# Patient Record
Sex: MALE | Race: WHITE | URBAN - METROPOLITAN AREA
[De-identification: names, ages, dates, MRNs, and addresses within clinical notes are randomized per-mention and may not be internally consistent; named-entity substitution may affect disease eponyms.]

---

## 2020-03-12 ENCOUNTER — OFFICE VISIT (OUTPATIENT)
Dept: SURGERY | Age: 35
End: 2020-03-12

## 2020-03-12 VITALS — RESPIRATION RATE: 18 BRPM | HEIGHT: 73 IN | TEMPERATURE: 98.9 F | HEART RATE: 90 BPM | OXYGEN SATURATION: 97 %

## 2020-03-12 DIAGNOSIS — L02.91 ABSCESS: Primary | ICD-10-CM

## 2020-03-12 RX ORDER — IBUPROFEN 600 MG/1
TABLET ORAL
COMMUNITY
Start: 2020-03-10

## 2020-03-12 RX ORDER — SULFAMETHOXAZOLE AND TRIMETHOPRIM 800; 160 MG/1; MG/1
TABLET ORAL
COMMUNITY
Start: 2020-03-10

## 2020-03-12 RX ORDER — OXYCODONE HYDROCHLORIDE 5 MG/1
5 TABLET ORAL
Qty: 5 TAB | Refills: 0 | Status: SHIPPED | OUTPATIENT
Start: 2020-03-12 | End: 2020-03-15

## 2020-03-12 RX ORDER — AMOXICILLIN AND CLAVULANATE POTASSIUM 875; 125 MG/1; MG/1
TABLET, FILM COATED ORAL
COMMUNITY
Start: 2020-03-10

## 2020-03-12 NOTE — PROGRESS NOTES
1. Have you been to the ER, urgent care clinic since your last visit? Hospitalized since your last visit? No    2. Have you seen or consulted any other health care providers outside of the [address] [place] [address] since your last visit? Include any pap smears or colon screening.  No

## 2020-03-12 NOTE — PATIENT INSTRUCTIONS
Daily wound care: 
Remove packing daily starting 3/13/2020 You may shower and get wound wet Pat dry Repack with 1/2 inch iodoform gauze Cover with 4X4 and tape Skin Abscess: Care Instructions Your Care Instructions A skin abscess is a bacterial infection that forms a pocket of pus. A boil is a kind of skin abscess. The doctor may have cut an opening in the abscess so that the pus can drain out. You may have gauze in the cut so that the abscess will stay open and keep draining. You may need antibiotics. You will need to follow up with your doctor to make sure the infection has gone away. The doctor has checked you carefully, but problems can develop later. If you notice any problems or new symptoms, get medical treatment right away. Follow-up care is a key part of your treatment and safety. Be sure to make and go to all appointments, and call your doctor if you are having problems. It's also a good idea to know your test results and keep a list of the medicines you take. How can you care for yourself at home? · Apply warm and dry compresses, a heating pad set on low, or a hot water bottle 3 or 4 times a day for pain. Keep a cloth between the heat source and your skin. · If your doctor prescribed antibiotics, take them as directed. Do not stop taking them just because you feel better. You need to take the full course of antibiotics. · Take pain medicines exactly as directed. ? If the doctor gave you a prescription medicine for pain, take it as prescribed. ? If you are not taking a prescription pain medicine, ask your doctor if you can take an over-the-counter medicine. · Keep your bandage clean and dry. Change the bandage whenever it gets wet or dirty, or at least one time a day. · If the abscess was packed with gauze: 
? Keep follow-up appointments to have the gauze changed or removed.  If the doctor instructed you to remove the gauze, follow the instructions you were given for how to remove it. ? After the gauze is removed, soak the area in warm water for 15 to 20 minutes 2 times a day, until the wound closes. When should you call for help? Call your doctor now or seek immediate medical care if: 
  · You have signs of worsening infection, such as: 
? Increased pain, swelling, warmth, or redness. ? Red streaks leading from the infected skin. ? Pus draining from the wound. ? A fever.  
 Watch closely for changes in your health, and be sure to contact your doctor if: 
  · You do not get better as expected. Where can you learn more? Go to http://porsche-amadeo.info/ Enter E635 in the search box to learn more about \"Skin Abscess: Care Instructions. \" Current as of: October 30, 2019Content Version: 12.4 © 1882-0298 [employer]. Care instructions adapted under license by [employer] (which disclaims liability or warranty for this information). If you have questions about a medical condition or this instruction, always ask your healthcare professional. [address] any warranty or liability for your use of this information.

## 2020-03-12 NOTE — PROGRESS NOTES
Jaylene Todd. is a 30 y.o. male who is referred by [facility], [address] [place] [address], for further evaluation of a right breast abscess. MrJeremias Mckeon tells me that he developed a \"pimple\" on the right side of his chest approximately one week ago. He drained the \"pimple\" in the shower. Despite this, he developed progressive right chest pain, swelling and redness. Associated chills and nausea. Seen at [facility] where he was found to have an abscess. Started on antibiotics with some improvement in his symptoms. He has otherwise been in his usual state of health. Past Medical History:   Diagnosis Date    Abscess 3/12/2020     History reviewed. No pertinent surgical history. History reviewed. No pertinent family history. Social History     Socioeconomic History    Marital status: SINGLE     Spouse name: Not on file    Number of children: Not on file    Years of education: Not on file    Highest education level: Not on file     Review of systems negative except as noted. Review of Systems   Constitutional: Positive for chills. Gastrointestinal: Positive for blood in stool and nausea. Musculoskeletal: Positive for joint pain. Right breast pain. Neurological: Positive for weakness and headaches. Psychiatric/Behavioral: Positive for depression. The patient is nervous/anxious and has insomnia. Physical Exam  Vitals signs reviewed. Constitutional:       General: He is not in acute distress. Appearance: Normal appearance. HENT:      Head: Normocephalic and atraumatic. Eyes:      General: No scleral icterus. Neck:      Musculoskeletal: Neck supple. Cardiovascular:      Rate and Rhythm: Normal rate and regular rhythm. Pulmonary:      Effort: Pulmonary effort is normal.      Breath sounds: Normal breath sounds. Chest:          Comments: There is an open wound with purulent appearing drainage. There is an associated fluctuant subcutaneous mass.  Overlying skin is indurated and cellulitic. Clinically, this is c/w an abscess. Abdominal:      General: There is no distension. Palpations: Abdomen is soft. Tenderness: There is no abdominal tenderness. There is no guarding or rebound. Musculoskeletal: Normal range of motion. Lymphadenopathy:      Cervical: No cervical adenopathy. Neurological:      General: No focal deficit present. Mental Status: He is alert. ASSESSMENT and PLAN  Blima Smiling. is a 30 y.o. male with a right breastabscess. He should benefit from improved drainage. Discussed procedure with him including risks of bleeding, further infection, need for further surgery. He understands and wishes to proceed. Consent on chart. [facility]  OFFICE PROCEDURE PROGRESS NOTE        Chart reviewed for the following:   Liliane Guzman MD, have reviewed the History, Physical and updated the Allergic reactions for [address] [place] [address] Drive performed immediately prior to start of procedure:   Liliane Guzman MD, have performed the following reviews on Blima Smiling. prior to the start of the procedure:            * Patient was identified by name and date of birth   * Agreement on procedure being performed was verified  * Risks and Benefits explained to the patient  * Procedure site verified and marked as necessary  * Patient was positioned for comfort  * Consent was signed and verified     Time: 5:15 PM      Date of procedure: 3/12/2020    Procedure performed by:  Josias Bryson MD    Provider assisted by: Fredy Hinson LPN    How tolerated by patient: tolerated the procedure well with no complications    Post Procedural Pain Scale: 2 - Hurts Little Bit    Comments: None. Procedure: Following betadine prep and drape, local anesthetic was infiltrated and the right chest wound was extended sharply. Purulent material evacuated. Loculations broken up.  The skin edges were sharply debrided back to healthy appearing tissue. Hemostasis with pressure. Wound packed with iodoform gauze. Dry dressing applied. Told Mr. Rodriguez Rumble that he can remove packing tomorrow and get open wound wet. Asked him to keep a dry dressing over the wound. Abx as prescribed. Rx for Oxycodone. Will see on March 13, 2020 for a wound check.       CC: [facility], Yolette Comes

## 2020-03-13 ENCOUNTER — OFFICE VISIT (OUTPATIENT)
Dept: SURGERY | Age: 35
End: 2020-03-13

## 2020-03-13 VITALS
HEART RATE: 86 BPM | HEIGHT: 73 IN | WEIGHT: 255 LBS | TEMPERATURE: 97.9 F | BODY MASS INDEX: 33.8 KG/M2 | RESPIRATION RATE: 18 BRPM | OXYGEN SATURATION: 96 % | DIASTOLIC BLOOD PRESSURE: 78 MMHG | SYSTOLIC BLOOD PRESSURE: 126 MMHG

## 2020-03-13 DIAGNOSIS — Z51.89 WOUND CHECK, ABSCESS: Primary | ICD-10-CM

## 2020-03-13 DIAGNOSIS — N61.0 CELLULITIS OF RIGHT BREAST: ICD-10-CM

## 2020-03-13 NOTE — PATIENT INSTRUCTIONS
Ok to shower and remove packing in the shower Pack wound every day with gauze and then cover with dry dressing (maxi pad works well unscented) Finish the antibiotics For pain take the ibuprofen 600 mg every 6-8 hours Follow up in 7 -10 days

## 2020-03-13 NOTE — PROGRESS NOTES
Chief Complaint   Patient presents with    Wound Check     right breast.     Derichine Mayra. presents today for wound check pod 1 I+D right breast abscess. Dr. Sonny Ken performed an I+D yesterday at [facility]. Patient remains on antibiotics. Has had some chills, no fever. No nipple discharge. Still having a lot of pain and took a percocet last night. Has a friend that is here with him today that can do the wound care. Visit Vitals  /78   Pulse 86   Temp 97.9 °F (36.6 °C) (Oral)   Resp 18   Ht 6' 1\" (1.854 m)   Wt 255 lb (115.7 kg)   SpO2 96%   BMI 33.64 kg/m²     A + O x 3  Chest  CTA  Right breast with erythema edema and some induration; wound at 10 o'clock lateral aspect is quite tender, scant bloody drainage, no odor, no necrotic tissue; moist   COR  Regular       ICD-10-CM ICD-9-CM    1. Wound check, abscess Z51.89 V58.89    2. Cellulitis of right breast N61.0 611.0      Abscess right breast with cellulitis  S/p I+D  Wound area gently cleaned and packed with 1\" Iodoform gauze; covered with 4x4 and secured with tape   May remove packing in the shower and friend will do wound care every day   Pack with 1\" plain gauze every day and prn   Ibuprofen 600 mg q 6 hours prn pain   Avoid strenuous activity   Follow up wound check in 7-10 days   Finish course of antibiotics   Chiquita Mayra. verbalized understanding and questions were answered to the best of my knowledge and ability. Wound care  educational materials were provided.

## 2020-03-13 NOTE — PROGRESS NOTES
1. Have you been to the ER, urgent care clinic since your last visit? Hospitalized since your last visit? No    2. Have you seen or consulted any other health care providers outside of the [address] [place] [address] since your last visit? Include any pap smears or colon screening.  No

## 2020-03-19 ENCOUNTER — TELEPHONE (OUTPATIENT)
Dept: SURGERY | Age: 35
End: 2020-03-19

## 2020-03-19 NOTE — TELEPHONE ENCOUNTER
Pt screened. Has no respiratory problems, ear aches, sneezing, coughing, runny nose, chills, fever or sore throat. Pt has not been out of the country in the last month or has been around someone who is sick or has the symptoms above. Pt aware no visitors are allowed at the time. Pt agreed and understood.

## 2020-03-20 ENCOUNTER — OFFICE VISIT (OUTPATIENT)
Dept: SURGERY | Age: 35
End: 2020-03-20

## 2020-03-20 VITALS
SYSTOLIC BLOOD PRESSURE: 130 MMHG | RESPIRATION RATE: 20 BRPM | HEIGHT: 73 IN | HEART RATE: 102 BPM | WEIGHT: 252.5 LBS | BODY MASS INDEX: 33.46 KG/M2 | OXYGEN SATURATION: 98 % | TEMPERATURE: 98.3 F | DIASTOLIC BLOOD PRESSURE: 84 MMHG

## 2020-03-20 DIAGNOSIS — L02.91 ABSCESS: Primary | ICD-10-CM

## 2020-03-20 NOTE — PROGRESS NOTES
Subjective:      Jaylene Houser is a 30 y.o. male presents status post I&D of right breast abscess on 3/12/2020. The wound is no longer being packed. The redness has decreased. No complaints of pain. He has finished his antibiotics. Patient has an advanced directive:  Not on file. Mr. Esther Mckeon has a reminder for a \"due or due soon\" health maintenance. I have asked that he contact his primary care provider for follow-up on this health maintenance. Objective:     Visit Vitals  /84 (BP 1 Location: Left arm, BP Patient Position: Sitting)   Pulse (!) 102   Temp 98.3 °F (36.8 °C) (Oral)   Resp 20   Ht 6' 1\" (1.854 m)   Wt 252 lb 8 oz (114.5 kg)   SpO2 98%   BMI 33.31 kg/m²       General:  alert, cooperative       Incision:   healing well, redness decreased, non-tender. Assessment:     Doing well postoperatively. Plan:     1. Keep the area covered. Apply warm compresses 3 times a day. 2. Follow up in 2 weeks.

## 2020-03-20 NOTE — PROGRESS NOTES
1. Have you been to the ER, urgent care clinic since your last visit? Hospitalized since your last visit? No    2. Have you seen or consulted any other health care providers outside of the [address] [place] [address] since your last visit? Include any pap smears or colon screening.   No

## 2020-04-03 ENCOUNTER — OFFICE VISIT (OUTPATIENT)
Dept: SURGERY | Age: 35
End: 2020-04-03

## 2020-04-03 VITALS — BODY MASS INDEX: 33.25 KG/M2 | WEIGHT: 252 LBS | TEMPERATURE: 98.2 F

## 2020-04-03 DIAGNOSIS — L02.91 ABSCESS: Primary | ICD-10-CM

## 2020-04-03 NOTE — PROGRESS NOTES
Subjective:    I was at home while conducting this encounter. Consent:  He and/or his healthcare decision maker is aware that this patient-initiated Telehealth encounter is a billable service, with coverage as determined by his insurance carrier. He is aware that he may receive a bill and has provided verbal consent to proceed: Yes    This virtual visit was conducted via [email or web address]. Pursuant to the emergency declaration under the [facility], [facility] waiver authority and the [employer] Act, this Virtual  Visit was conducted to reduce the patient's risk of exposure to COVID-19 and provide continuity of care for an established patient. Services were provided through a video synchronous discussion virtually to substitute for in-person clinic visit. Due to this being a TeleHealth evaluation, many elements of the physical examination are unable to be assessed. Total Time: minutes: 5-10 minutes. Staci Carrasco is a 30 y.o. male presents for post I&D of right breast abscess on 3/12/2020  Healing well. Patient has an advanced directive:  Not on file. Mr. Dayan Whitney has a reminder for a \"due or due soon\" health maintenance. I have asked that he contact his primary care provider for follow-up on this health maintenance. Objective:     Visit Vitals  Temp 98.2 °F (36.8 °C) (Oral)   Wt 252 lb (114.3 kg)   BMI 33.25 kg/m²               Incision:   healing well, no drainage, no erythema     Assessment:     Doing well postoperatively. Plan:     1. Follow up as needed  2. Continue warm compresses as needed  Pt  verbalized understanding and questions were answered to the best of my knowledge and ability. Ricky Estrada

## 2020-04-03 NOTE — PROGRESS NOTES
1. Have you been to the ER, urgent care clinic since your last visit? Hospitalized since your last visit? No    2. Have you seen or consulted any other health care providers outside of the [address] [place] [address] since your last visit? Include any pap smears or colon screening.  No

## 2022-03-18 PROBLEM — L02.91 ABSCESS: Status: ACTIVE | Noted: 2020-03-12

## 2023-05-17 RX ORDER — SULFAMETHOXAZOLE AND TRIMETHOPRIM 800; 160 MG/1; MG/1
TABLET ORAL
COMMUNITY
Start: 2020-03-10

## 2023-05-17 RX ORDER — AMOXICILLIN AND CLAVULANATE POTASSIUM 875; 125 MG/1; MG/1
TABLET, FILM COATED ORAL
COMMUNITY
Start: 2020-03-10

## 2023-05-17 RX ORDER — IBUPROFEN 600 MG/1
TABLET ORAL
COMMUNITY
Start: 2020-03-10